# Patient Record
Sex: MALE | Race: WHITE
[De-identification: names, ages, dates, MRNs, and addresses within clinical notes are randomized per-mention and may not be internally consistent; named-entity substitution may affect disease eponyms.]

---

## 2018-06-12 ENCOUNTER — HOSPITAL ENCOUNTER (EMERGENCY)
Dept: HOSPITAL 25 - UCEAST | Age: 38
Discharge: HOME | End: 2018-06-12
Payer: SELF-PAY

## 2018-06-12 VITALS — SYSTOLIC BLOOD PRESSURE: 114 MMHG | DIASTOLIC BLOOD PRESSURE: 77 MMHG

## 2018-06-12 DIAGNOSIS — K52.9: ICD-10-CM

## 2018-06-12 DIAGNOSIS — R51: Primary | ICD-10-CM

## 2018-06-12 DIAGNOSIS — Z82.49: ICD-10-CM

## 2018-06-12 DIAGNOSIS — F17.210: ICD-10-CM

## 2018-06-12 PROCEDURE — G0463 HOSPITAL OUTPT CLINIC VISIT: HCPCS

## 2018-06-12 PROCEDURE — 99212 OFFICE O/P EST SF 10 MIN: CPT

## 2018-06-12 NOTE — UC
Abdominal Pain Male HPI





- HPI Summary


HPI Summary: 


Patient presenting with 2 days of frontal headache, stomach upset and decreased 

appetite that started after he was outside doing yardwork.  Thought he overdid 

the sun exposure so went inside but still doesn't feel well.  Patient did not 

take any OTC analgesics as he tries not to take any "man-made"medication.  No 

fever. Is keeping fluid down but not eating much.





- History of Current Complaint


Chief Complaint: UCGeneralIllness


Stated Complaint: HEADACHE NAUSEA


Time Seen by Provider: 06/12/18 14:11


Hx Obtained From: Patient


Onset/Duration: Gradual Onset, Lasting Days, Still Present


Timing: Constant


Severity Initially: Moderate


Severity Currently: Moderate


Pain Intensity: 4


Pain Scale Used: 0-10 Numeric


Location: Diffuse


Radiates: No


Character: Aching, Sharp


Aggravating Factor(s): Food


Alleviating Factor(s): Nothing


Associated Signs And Symptoms: Positive: Decreased Appetite, Nausea, Vomiting.  

Negative: Fever, Back Pain, Constipation, Blood in Stool, Urinary Symptoms, 

Diarrhea





- Allergies/Home Medications


Allergies/Adverse Reactions: 


 Allergies











Allergy/AdvReac Type Severity Reaction Status Date / Time


 


No Known Allergies Allergy   Verified 06/12/18 14:03














PMH/Surg Hx/FS Hx/Imm Hx


Previously Healthy: Yes





- Surgical History


Surgical History: None


Surgery Procedure, Year, and Place: denies





- Family History


Known Family History: Positive: Hypertension





- Social History


Alcohol Use: None


Substance Use Type: None


Smoking Status (MU): Current Every Day Smoker


Type: Cigarettes


Amount Used/How Often: 1/2 ppd


Have You Smoked in the Last Year: Yes


Household Exposure Type: Cigarettes





- Immunization History


Most Recent Tetanus Shot: UTD





Review of Systems


Constitutional: Fatigue


Respiratory: Negative


Cardiovascular: Negative


Gastrointestinal: Vomiting, Nausea


Genitourinary: Negative


Neurological: Headache


All Other Systems Reviewed And Are Negative: Yes





Physical Exam


Triage Information Reviewed: Yes


Appearance: Well-Appearing, No Pain Distress, Well-Nourished


Vital Signs: 


 Initial Vital Signs











Temp  98.4 F   06/12/18 14:00


 


Pulse  79   06/12/18 14:00


 


Resp  19   06/12/18 14:00


 


BP  114/77   06/12/18 14:00


 


Pulse Ox  100   06/12/18 14:00











Vital Signs Reviewed: Yes


Eyes: Positive: Conjunctiva Clear


ENT: Positive: Hearing grossly normal, Pharynx normal, TMs normal


Neck: Positive: Supple, Nontender, No Lymphadenopathy


Respiratory Exam: Normal


Cardiovascular Exam: Normal


Abdomen Description: Positive: Nontender, Soft.  Negative: CVA Tenderness (R), 

CVA Tenderness (L), Distended, Guarding


Musculoskeletal: Positive: No Edema


Neurological: Positive: Alert


Psychological: Positive: Age Appropriate Behavior


Skin: Negative: rashes





Abd Pain Male Course/Dx





- Course


Course Of Treatment: PT SX MAY BE DUE TO SUN EXPOSURE BUT ALSO MAY HAVE A VIRAL 

GASTROENTERITIS. ADVISED TO STAY WELL HYDRATED AND HAVE GIVEN ZOFRAN TO USE IF 

NEEDED. OTC MEDS FOR HA. PT DECLINES LABS TODAY. WILL FOLLOW-UP WITH HIS PCP, 

HERE OR IN THE ED IF HE DOES NOT IMPROVE OVER THE NEXT FEW DAYS.





- Differential Dx/Clinical Impression


Provider Diagnoses: 1. GASTROENTERITIS.  2. HEADACHE





Discharge





- Sign-Out/Discharge


Documenting (check all that apply): Discharge/Admit/Transfer





- Discharge Plan


Condition: Stable


Disposition: HOME


Prescriptions: 


Ondansetron ODT TAB* [Zofran Odt TAB*] 4 mg PO Q6H PRN #20 tab.odt


 PRN Reason: Nausea/Vomiting


Patient Education Materials:  Gastroenteritis (ED), General Headache (ED)


Forms:  *Work Release


Referrals: 


No Primary Care Phys,NOPCP [Primary Care Provider] - 


Additional Instructions: 


YOUR SYMPTOMS MAY BE DUE TO YOUR SUN EXPOSURE A COUPLE OF DAYS AGO.  YOU MAY 

ALSO HAVE PICKED UP A STOMACH BUG.  CONTINUE TO REST AND ENSURE ADEQUATE 

HYDRATION.  ERX FOR ZOFRAN SENT TO THE PHARMACY FOR YOU TO USE AS NEEDED FOR 

NAUSEA.  TAKE IBUPROFEN OR TYLENOL FOR YOUR HEADACHE.  IF YOUR SYMPTOMS DO NOT 

IMPROVE OVER THE NEXT FEW DAYS, RETURN HERE OR GO TO THE ER FOR FURTHER 

EVALUATION.  AT THAT TIME YOU MAY BENEFIT FROM LABS AND/OR IMAGING. 





- Billing Disposition and Condition


Condition: STABLE


Disposition: Home

## 2018-11-12 ENCOUNTER — HOSPITAL ENCOUNTER (EMERGENCY)
Dept: HOSPITAL 25 - UCEAST | Age: 38
Discharge: HOME | End: 2018-11-12
Payer: COMMERCIAL

## 2018-11-12 VITALS — SYSTOLIC BLOOD PRESSURE: 129 MMHG | DIASTOLIC BLOOD PRESSURE: 79 MMHG

## 2018-11-12 DIAGNOSIS — J18.9: Primary | ICD-10-CM

## 2018-11-12 DIAGNOSIS — F17.210: ICD-10-CM

## 2018-11-12 PROCEDURE — 99212 OFFICE O/P EST SF 10 MIN: CPT

## 2018-11-12 PROCEDURE — G0463 HOSPITAL OUTPT CLINIC VISIT: HCPCS

## 2018-11-12 NOTE — UC
Respiratory Complaint HPI





- HPI Summary


HPI Summary: 





Patient presents with an unremarkable past medical history. He presents today 

with 6 day onset fever, fatigue, chest congestion and coughing. He states he 

also hears wheezing especially at night time. He denies chest pain, shortness 

of breaths, weakness, nausea, vomiting, or diarrhea. 





- History of Current Complaint


Chief Complaint: UCGeneralIllness


Stated Complaint: COUGH,BODY CHILLS


Time Seen by Provider: 11/12/18 12:42


Hx Obtained From: Patient


Onset/Duration: Gradual Onset, Lasting Days


Timing: Constant


Pain Intensity: 0


Character: Cough: Productive


Aggravating Factors: Deep Breaths, Recumbent Position


Alleviating Factors: Upright Position, Spontaneous Resolution


Associated Signs And Symptoms: Positive: Wheezing, URI, Nasal Congestion, Sinus 

Discomfort





- Risk Factors


Pulmonary Embolism Risk Factors: Negative


Cardiac Risk Factors: Negative


Pseudomonas Risk Factors: Negative


Tuberculosis Risk Factors: Negative





- Allergies/Home Medications


Allergies/Adverse Reactions: 


 Allergies











Allergy/AdvReac Type Severity Reaction Status Date / Time


 


No Known Allergies Allergy   Verified 11/12/18 12:02














PMH/Surg Hx/FS Hx/Imm Hx


Previously Healthy: Yes





- Surgical History


Surgical History: None


Surgery Procedure, Year, and Place: denies





- Family History


Known Family History: Positive: Hypertension





- Social History


Occupation: Employed Full-time


Lives: Alone


Alcohol Use: None


Substance Use Type: None


Smoking Status (MU): Current Every Day Smoker


Type: Cigarettes


Amount Used/How Often: 1/2 ppd


Have You Smoked in the Last Year: Yes


Household Exposure Type: Cigarettes





- Immunization History


Most Recent Tetanus Shot: UTD





Review of Systems


All Other Systems Reviewed And Are Negative: Yes


Constitutional: Positive: Fatigue


Skin: Positive: Negative


Eyes: Positive: Negative


ENT: Positive: Nasal Discharge, Sinus Congestion


Respiratory: Positive: Cough


Cardiovascular: Positive: Negative


Gastrointestinal: Positive: Negative


Genitourinary: Positive: Negative


Motor: Positive: Negative


Neurovascular: Positive: Negative


Musculoskeletal: Positive: Negative


Neurological: Positive: Negative


Psychological: Positive: Negative





Physical Exam


Triage Information Reviewed: Yes


Appearance: Ill-Appearing


Vital Signs: 


 Initial Vital Signs











Temp  99.1 F   11/12/18 12:03


 


Pulse  97   11/12/18 12:03


 


Resp  18   11/12/18 12:03


 


BP  129/79   11/12/18 12:03


 


Pulse Ox  97   11/12/18 12:03











Vital Signs Reviewed: Yes


Eye Exam: Normal


ENT: Positive: Pharynx normal, TM red


Neck exam: Normal


Neck: Positive: 1


Respiratory Exam: Normal


Respiratory: Positive: Crackles - RLL with inspriatory crackles., Rhonchi


Cardiovascular Exam: Normal


Abdominal Exam: Normal


Musculoskeletal Exam: Normal


Neurological Exam: Normal


Psychological Exam: Normal


Skin Exam: Normal





UC Diagnostic Evaluation





- Laboratory


O2 Sat by Pulse Oximetry: 97





Respiratory Course/Dx





- Course


Course Of Treatment: Patient presents with six day onset complaints of fatigue, 

malaise, fever with productive cough. He has crackles in the RLL consistent 

with CAP. He will be given an albuterol treatment in the clinic, and discharged 

home on doxcycline, albuterol hfa, and tesslon pearles. I have taken him out of 

work for three more days to rest and recover. If he does not improve as 

anticipated he understands that he need to com back for re-evaluation.





- Differential Dx/Diagnosis


Differential Diagnosis/HQI/PQRI: Other - CAP


Provider Diagnoses: CAP





Discharge





- Sign-Out/Discharge


Documenting (check all that apply): Patient Departure


All imaging exams completed and their final reports reviewed: No Studies





- Discharge Plan


Condition: Stable


Disposition: HOME


Prescriptions: 


Albuterol HFA INHALER* [Ventolin HFA Inhaler*] 1 - 2 puff INH Q6H PRN #1 mdi


 PRN Reason: Wheezing


Benzonatate CAP* [Tessalon 100 MG CAP*] 100 mg PO TID #14 cap


DOXYcycline CAP(*) [DOXYcycline 100MG CAP(*)] 100 mg PO BID #20 cap


Patient Education Materials:  Community Acquired Pneumonia (DC)


Forms:  *Work Release


Referrals: 


Mitul Huynh DO [Primary Care Provider] - 





- Billing Disposition and Condition


Condition: STABLE


Disposition: Home





- Attestation Statements


Provider Attestation: 





Per institutional requirements, I have reviewed the chart, however, I was not 

consulted specifically or made aware of this patient by the  midlevel provider.

  I did not personally evaluate, interact with , or disposition  this patient.

## 2019-06-18 ENCOUNTER — HOSPITAL ENCOUNTER (EMERGENCY)
Dept: HOSPITAL 25 - UCEAST | Age: 39
Discharge: HOME | End: 2019-06-18
Payer: COMMERCIAL

## 2019-06-18 VITALS — DIASTOLIC BLOOD PRESSURE: 82 MMHG | SYSTOLIC BLOOD PRESSURE: 123 MMHG

## 2019-06-18 DIAGNOSIS — S39.012A: Primary | ICD-10-CM
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
## 2019-06-18 DIAGNOSIS — F17.210: ICD-10-CM
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
## 2019-06-18 DIAGNOSIS — M51.37: ICD-10-CM
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
## 2019-06-18 DIAGNOSIS — M62.830: ICD-10-CM

## 2019-06-18 PROCEDURE — 96372 THER/PROPH/DIAG INJ SC/IM: CPT

## 2019-06-18 PROCEDURE — G0463 HOSPITAL OUTPT CLINIC VISIT: HCPCS

## 2019-06-18 PROCEDURE — 72110 X-RAY EXAM L-2 SPINE 4/>VWS: CPT

## 2019-06-18 PROCEDURE — 99212 OFFICE O/P EST SF 10 MIN: CPT

## 2019-06-18 NOTE — UC
Abdominal Pain Male HPI





- HPI Summary


HPI Summary: 





39 y/o male presents to the urgent care c/o left side lower back pain radiating 

to his left leg and mild tingling sensation over the left foot since yesterday.

  Pt reports he was doing some Heavy lifting Sunday afternoon and yesterday he 

woke up w/ the lower back pain.  Pt States Hx of back injury at work in 2003 

and since then he has been in disability.  Sometimes his chronic cody k pain 

gets exacerbated and it improves w/ a muscle relaxant. 








- History of Current Complaint


Chief Complaint: UCAbdominalPain


Stated Complaint: PAIN LT SIDE NUMBNESS


Time Seen by Provider: 06/18/19 15:37


Hx Obtained From: Patient


Onset/Duration: Gradual Onset, Lasting Days - 1 day, Still Present, Worse Since 

- this morning w/ radiation to the left lower leg


Timing: Constant


Severity Initially: Mild


Severity Currently: Moderate


Pain Intensity: 5


Pain Scale Used: 0-10 Numeric





- Allergies/Home Medications


Allergies/Adverse Reactions: 


 Allergies











Allergy/AdvReac Type Severity Reaction Status Date / Time


 


No Known Allergies Allergy   Verified 06/18/19 14:43














PMH/Surg Hx/FS Hx/Imm Hx





- Surgical History


Surgical History: None


Surgery Procedure, Year, and Place: denies





- Family History


Known Family History: Positive: Hypertension





- Social History


Alcohol Use: Occasionally


Substance Use Type: Marijuana


Substance Use Comment - Amount & Last Used: 2 times a day


Smoking Status (MU): Light Every Day Tobacco Smoker


Type: Cigarettes


Amount Used/How Often: 1/2 ppd


Have You Smoked in the Last Year: Yes


Household Exposure Type: Cigarettes





- Immunization History


Most Recent Tetanus Shot: UTD





Physical Exam


Vital Signs: 


 Initial Vital Signs











Temp  100.0 F   06/18/19 14:39


 


Pulse  93   06/18/19 14:39


 


Resp  18   06/18/19 14:39


 


BP  115/76   06/18/19 14:39


 


Pulse Ox  98   06/18/19 14:39














Abd Pain Male Course/Dx





- Course


Course Of Treatment: 





IMPRESSION:


DEGENERATIVE DISC DISEASE AND OSTEOARTHRITIS AT L5-S1








- Differential Dx/Clinical Impression


Differential Diagnosis/HQI/PQRI: Constipation, Diverticulitis, Urinary Tract 

Infection, Other - lowerback sptrain, DDD


Provider Diagnosis: 


 Low back strain, Degenerative disc disease at L5-S1 level, Back muscle spasm








Discharge





- Sign-Out/Discharge


Documenting (check all that apply): Patient Departure - d/C home


All imaging exams completed and their final reports reviewed: Yes





- Discharge Plan


Condition: Stable


Disposition: HOME


Prescriptions: 


Cyclobenzaprine TAB* [Flexeril 10 MG TAB*] 10 mg PO TID PRN #21 tab


 PRN Reason: Spasms - Back


Ibuprofen TAB* [Motrin TAB* 800 MG] 800 mg PO Q6H PRN #30 tab


 PRN Reason: back pain


methylPREDNISolone [Medrol Dosepak 4 MG*] 4 mg PO .SEE DAMARIS INSTRUCTION #1 damaris


Patient Education Materials:  Low Back Strain (ED), Degenerative Disc Disease (

ED)


Referrals: 


Mitul Huynh DO [Primary Care Provider] - 3 Days


Marianne Grimaldo Ae, RN [Registered Nurse] - 3 Days


Additional Instructions: 


1- Please take Ibuprofen  PO q6-8hrs prn   as directed after meals for pain 

starting tomorrow since you were given a Toradol IM inj today.


2- Take Flexeril PO as directed for muscle spasm. Please do not drive while 

taking the medication. Take Medrol dose damaris as directed to alleviate symptoms


3- Wear a back support. Avoid strenuous exercise of heavy lifting. 


3- Avoid strenuous exercise or heavy lifting. 


4- Please call Spinal  Nurse Navigator: Lisa Grimaldo: [phone] for further 

management of your Degenerative disc disease and herniated discs.








- Billing Disposition and Condition


Condition: STABLE


Disposition: Home

## 2019-06-19 NOTE — UC
Back Pain HPI





- HPI Summary


HPI Summary: 





37 y/o male presents to the urgent care c/o left side lower back pain radiating 

to his let lower leg w/ mild tingling sensation over the left foot since 

yesterday.  Pt reports Hx of lower back injury at work in 2003 and since then 

he has been in disability. Pt states sometimes his chronic back pain 

exacerbates with heavy lifting and gets better w/ a muscle relaxant. Pt has not 

taken anything to alleviate symptoms.  Sunday he was helping a friend to move 

and he has to do some heavy lifting. Yesterday, he woke up w/ lower back pain. 

Pain now  is sharp w/ certain movement, specially bending, sitting or laying 

down, it 5/10.  Pt denies saddle anesthesia, fecal or urinary incontinence, 

urinary symptoms, fever, SOB, chest pain,abdominal pain, N/V/D. Hx of kidney 

stones. Pt declines UA.    





- History of Current Complaint


Chief Complaint: UCAbdominalPain


Stated Complaint: PAIN LT SIDE NUMBNESS


Time Seen by Provider: 06/18/19 15:37


Hx Obtained From: Patient


Onset/Duration: Gradual Onset, Lasting Days - 1 day, Still Present, Worse Since 

- this morning w/ tingling sensation over the left foot


Timing: Lasting Days - 1 day


Pain Intensity: 5


Pain Scale Used: 0-10 Numeric


Back Pain: Is Discrete @ - left side lower back pain, Radiates To - left lower 

leg and foot


Character: Sharp


Aggravating Factor(s): Movement, Lifting, Bending, Other - laying down


Alleviating Factor(s): Rest


Associated Signs And Symptoms: Positive: Numbness - left foot at times, 

Tingling - left foot at times.  Negative: Swelling, Redness, Bruising, Fever, 

Weakness, Abdominal Pain, Flank Pain, Bladder Incontinence, Bowel Incontinence, 

Weight Loss, Pain with Weight Bearing


Related History: Previous Back Injury - at work in 2003





- Risk Factors


AAA Risk Factors: Negative


TAD Risk Factors: Negative


Cauda Equina Risk Factors: Negative


Epidural Abscess Risk Factors: Negative





- Allergies/Home Medications


Allergies/Adverse Reactions: 


 Allergies











Allergy/AdvReac Type Severity Reaction Status Date / Time


 


No Known Allergies Allergy   Verified 06/18/19 14:43














PMH/Surg Hx/FS Hx/Imm Hx


Previously Healthy: Yes


Other Neurological History: Chronic back pain





- Surgical History


Surgical History: None


Surgery Procedure, Year, and Place: denies





- Family History


Known Family History: Positive: Hypertension





- Social History


Occupation: Disabled


Lives: With Family


Alcohol Use: Occasionally


Substance Use Type: Marijuana


Substance Use Comment - Amount & Last Used: 2 times a day


Smoking Status (MU): Light Every Day Tobacco Smoker


Type: Cigarettes


Amount Used/How Often: 1/2 ppd


Have You Smoked in the Last Year: Yes


Household Exposure Type: Cigarettes





- Immunization History


Most Recent Tetanus Shot: UTD





Review of Systems


All Other Systems Reviewed And Are Negative: Yes


Constitutional: Positive: Negative


Skin: Positive: Negative


Eyes: Positive: Negative


ENT: Positive: Negative


Respiratory: Positive: Negative


Cardiovascular: Positive: Negative


Gastrointestinal: Positive: Negative


Genitourinary: Positive: Negative


Motor: Positive: Negative


Neurovascular: Positive: Negative


Musculoskeletal: Positive: Decreased ROM - lower back, Other: - lower back pain 

radiating to the left lower extremity s/p heavy lifting


Neurological: Positive: Numbness - left foot at times


Psychological: Positive: Negative


Is Patient Immunocompromised?: No





Physical Exam





- Summary


Physical Exam Summary: 





Vital Signs Reviewed: Yes


Appearance: Well-Appearing, Well-Nourished, male sitting in the examining table 

w/o any apparent distress. 


Eyes: Positive: Conjunctiva Clear - PERRLA, EOMI.


ENT: Positive: Normal ENT inspection, Hearing grossly normal, Pharynx normal, 

TMs normal, Uvula midline


Neck: Positive: Supple, Nontender, No Lymphadenopathy


Respiratory: Positive: Chest non-tender, Lungs clear, Normal breath sounds, No 

respiratory distress


Cardiovascular: Positive: RRR, No Murmur, Pulses Normal, Brisk Capillary Refill


Abdomen Description: Positive: Nontender, No Organomegaly, Soft.  Negative: CVA 

Tenderness (R), CVA Tenderness (L)


Bowel Sounds: Positive: Present


Musculoskeletal: Positive: Strength Intact,   BACK: Patient walked into the 

urgent care room with symmetric ambulation, No signs of limping, antalgic, able 

to bear weight. No signs of trauma, No masses palpated. Point tenderness at the 

level of L5-S1, left side paraspinal muscle tenderness at the same level. No 

CVAT, no flank ecchymosis . No sacroiliac notch tenderness, No saddle 

anesthesia.ROM: limited due to pain, Straight Leg Raise: negative. Patellar 

reflexes: brisk, symmetric Muscle strength lower extremities. Dorsiflexion/ 

plantar flexion of ankles. Heel/ toe walk. Lower extremities: Femoral, popliteal

, posterior tibial, and dorsalis pedis pulses WNL. Pt refuse rectal exam


Neurological: Positive: Alert, Muscle Tone Normal


Psychological Exam: Normal


Skin Exam: Normal











Triage Information Reviewed: Yes


Vital Signs: 


 Initial Vital Signs











Temp  100.0 F   06/18/19 14:39


 


Pulse  93   06/18/19 14:39


 


Resp  18   06/18/19 14:39


 


BP  115/76   06/18/19 14:39


 


Pulse Ox  98   06/18/19 14:39














Back Pain Course/Dx





- Course


Course Of Treatment: 





37 y/o male presents to the urgent care c/o left side lower back pain radiating 

to his let lower leg w/ mild tingling sensation over the left foot since 

yesterday.  Pt reports Hx of lower back injury at work in 2003 and since then 

he has been in disability. Pt states sometimes his chronic back pain 

exacerbates with heavy lifting and gets better w/ a muscle relaxant. Pt has not 

taken anything to alleviate symptoms.  Sunday he was helping a friend to move 

and he has to do some heavy lifting. Yesterday, he woke up w/ lower back pain. 

Pain now  is sharp w/ certain movement, specially bending, sitting or laying 

down, it 5/10.  Pt denies saddle anesthesia, fecal or urinary incontinence, 

urinary symptoms, fever, SOB, chest pain,abdominal pain, N/V/D. Hx of kidney 

stones. Pt declines UA.   Hx obtained.  Pt w/Point tenderness at the level of L5

-S1, left side paraspinal muscle tenderness at the same level on examination. I-

stops #[id number]:negative. Lumbosacral X-ray ordered, Impression: No 

radiographic evidence for traumatic injury. Degenerative disc disease and 

osteoarthritis at L5-S1 as per radiologist. Toradol IM inj ordered at the 

clinic. Given by nurse. Pt tolerated well medication and pain decrease. Pt Rx 

Ibuprofen PO, flexeril PO and Medrol dose damaris as as directed below.  Patient 

was instructed to wear a back support and to f/u with Spinal Nurse Navigator 

for further management on his Degenerative Disc disease. Patient is able to 

ambulate freely w/o aid or limp. Plan of care was discussed with the patient 

and patient understands and agrees. All questions were answered at patient 

satisfaction.  Pt left clinic hemodynamically stable and ambulating. .


 . 





- Differential Dx/Diagnosis


Differential Diagnosis/HQI/PQRI: Arthritis, Compressive Cord Syndrome, Fracture

, Herniated Disc, Renal Colic, Strain, Sprain


Provider Diagnosis: 


 Low back strain, Degenerative disc disease at L5-S1 level, Back muscle spasm








Discharge





- Sign-Out/Discharge


Documenting (check all that apply): Patient Departure - D/C home 


All imaging exams completed and their final reports reviewed: Yes





- Discharge Plan


Condition: Stable


Disposition: HOME


Prescriptions: 


Cyclobenzaprine TAB* [Flexeril 10 MG TAB*] 10 mg PO TID PRN #21 tab


 PRN Reason: Spasms - Back


Ibuprofen TAB* [Motrin TAB* 800 MG] 800 mg PO Q6H PRN #30 tab


 PRN Reason: back pain


methylPREDNISolone [Medrol Dosepak 4 MG*] 4 mg PO .SEE DAMARIS INSTRUCTION #1 damaris


Patient Education Materials:  Low Back Strain (ED), Degenerative Disc Disease (

ED)


Referrals: 


Mitul Huynh DO [Primary Care Provider] - 3 Days


Marianne Grimaldo Ae, RN [Registered Nurse] - 3 Days


Additional Instructions: 


1- Please take Ibuprofen  PO q6-8hrs prn   as directed after meals for pain 

starting tomorrow since you were given a Toradol IM inj today.


2- Take Flexeril PO as directed for muscle spasm. Please do not drive while 

taking the medication. Take Medrol dose damaris as directed to alleviate symptoms


3- Wear a back support. Avoid strenuous exercise of heavy lifting. 


3- Avoid strenuous exercise or heavy lifting. 


4- Please call Spinal  Nurse Navigator: Lisa Grimaldo: [phone] for further 

management of your Degenerative disc disease and herniated discs.








- Billing Disposition and Condition


Condition: STABLE


Disposition: Home





- Attestation Statements


Provider Attestation: 





I was available for consult. This patient was seen by the RUBIO. The patient was 

not presented to, seen by, or examined by me. -Luther